# Patient Record
Sex: MALE | Race: WHITE | NOT HISPANIC OR LATINO | Employment: OTHER | URBAN - NONMETROPOLITAN AREA
[De-identification: names, ages, dates, MRNs, and addresses within clinical notes are randomized per-mention and may not be internally consistent; named-entity substitution may affect disease eponyms.]

---

## 2022-12-18 ENCOUNTER — OFFICE VISIT (OUTPATIENT)
Dept: URGENT CARE | Facility: CLINIC | Age: 72
End: 2022-12-18

## 2022-12-18 VITALS
OXYGEN SATURATION: 99 % | TEMPERATURE: 98.3 F | HEART RATE: 72 BPM | RESPIRATION RATE: 18 BRPM | RESPIRATION RATE: 18 BRPM | HEART RATE: 72 BPM | OXYGEN SATURATION: 99 % | WEIGHT: 182 LBS | WEIGHT: 182 LBS | TEMPERATURE: 98.3 F

## 2022-12-18 DIAGNOSIS — J01.00 ACUTE NON-RECURRENT MAXILLARY SINUSITIS: Primary | ICD-10-CM

## 2022-12-18 RX ORDER — AMOXICILLIN 875 MG/1
875 TABLET, COATED ORAL 2 TIMES DAILY
Qty: 14 TABLET | Refills: 0 | Status: SHIPPED | OUTPATIENT
Start: 2022-12-18 | End: 2022-12-25

## 2022-12-18 RX ORDER — BISOPROLOL FUMARATE AND HYDROCHLOROTHIAZIDE 5; 6.25 MG/1; MG/1
TABLET ORAL
COMMUNITY
Start: 2022-10-16

## 2022-12-18 RX ORDER — LISINOPRIL 5 MG/1
TABLET ORAL
COMMUNITY
Start: 2022-10-16

## 2022-12-18 RX ORDER — SIMVASTATIN 20 MG
TABLET ORAL
COMMUNITY
Start: 2022-10-17

## 2022-12-18 RX ORDER — AMOXICILLIN 875 MG/1
875 TABLET, COATED ORAL 2 TIMES DAILY
Qty: 14 TABLET | Refills: 0 | Status: SHIPPED | OUTPATIENT
Start: 2022-12-18 | End: 2022-12-18

## 2022-12-18 RX ORDER — BENZONATATE 200 MG/1
CAPSULE ORAL
COMMUNITY
Start: 2022-12-04

## 2022-12-18 NOTE — PROGRESS NOTES
[address] [facility]        NAME: Rodney Muro is a 67 y o  male  : 1950    MRN: [id number]  DATE: 2022  TIME: 3:32 PM    Assessment and Plan   Acute non-recurrent maxillary sinusitis [J01 00]  1  Acute non-recurrent maxillary sinusitis  amoxicillin (AMOXIL) 875 mg tablet    DISCONTINUED: amoxicillin (AMOXIL) 875 mg tablet            Patient Instructions     Take the antibiotics with food in the morning  You can use a saline nasal spray for congestion  Use a warm mist humidifier or vaporizer  Hot tea with honey  Warm saline gargle or throat lozenge may help with a sore throat  Drink plenty of fluids  Follow up with PCP in 3-5 days  Proceed to  ER if symptoms worsen  Chief Complaint     Chief Complaint   Patient presents with   • Cough     X1 1/2 months: Had Covid approx x1 1/2 months ago and was recently seen at PCP this past Wednesday with this continued cough  The cough is not getting better and he continues with this persistent Dry & occas moist prod cough (clear)  History of Present Illness       Cough  This is a new problem  The current episode started more than 1 month ago  The problem has been gradually worsening  The problem occurs hourly  The cough is non-productive  Associated symptoms include headaches, nasal congestion and postnasal drip  Pertinent negatives include no chest pain, chills, ear pain, fever, rhinorrhea or shortness of breath  Treatments tried: OTC sinus medication  Review of Systems   Review of Systems   Constitutional: Negative for activity change, appetite change, chills and fever  HENT: Positive for congestion, postnasal drip and sinus pressure  Negative for ear pain and rhinorrhea  Respiratory: Positive for cough  Negative for shortness of breath  Cardiovascular: Negative for chest pain and palpitations  Neurological: Positive for headaches  All other systems reviewed and are negative          Current Medications Current Outpatient Medications:   •  amoxicillin (AMOXIL) 875 mg tablet, Take 1 tablet (875 mg total) by mouth 2 (two) times a day for 7 days, Disp: 14 tablet, Rfl: 0  •  benzonatate (TESSALON) 200 MG capsule, TAKE 1 CAPSULE BY MOUTH THREE TIMES A DAY AS NEEDED FOR COUGH, Disp: , Rfl:   •  bisoprolol-hydrochlorothiazide (ZIAC) 5-6 25 MG per tablet, , Disp: , Rfl:   •  lisinopril (ZESTRIL) 5 mg tablet, , Disp: , Rfl:   •  simvastatin (ZOCOR) 20 mg tablet, , Disp: , Rfl:     Current Allergies     Allergies as of 12/18/2022   • (No Known Allergies)            The following portions of the patient's history were reviewed and updated as appropriate: allergies, current medications, past family history, past medical history, past social history, past surgical history and problem list      No past medical history on file  No past surgical history on file  No family history on file  Medications have been verified  Objective   Pulse 72   Temp 98 3 °F (36 8 °C) (Oral)   Resp 18   Wt 82 6 kg (182 lb)   SpO2 99%        Physical Exam     Physical Exam  Vitals and nursing note reviewed  Constitutional:       General: He is not in acute distress  Appearance: Normal appearance  He is normal weight  He is not ill-appearing or toxic-appearing  HENT:      Right Ear: A middle ear effusion is present  Left Ear: A middle ear effusion is present  Nose:      Right Sinus: Frontal sinus tenderness present  Left Sinus: Frontal sinus tenderness present  Mouth/Throat:      Pharynx: Oropharynx is clear  Cardiovascular:      Rate and Rhythm: Normal rate and regular rhythm  Pulses: Normal pulses  Heart sounds: Normal heart sounds  Pulmonary:      Effort: Pulmonary effort is normal       Breath sounds: Normal breath sounds  Neurological:      Mental Status: He is alert

## 2022-12-18 NOTE — PATIENT INSTRUCTIONS
Take the antibiotics with food in the morning  You can use a saline nasal spray for congestion  Use a warm mist humidifier or vaporizer  Hot tea with honey  Warm saline gargle or throat lozenge may help with a sore throat  Drink plenty of fluids

## 2022-12-19 NOTE — PROGRESS NOTES
Patient called requesting a different medication, he is unable to swallow the pill secondary to size  Made patient aware the provider that seen him is not working today  Recommended to call pharmacy for assistance, or alternative to size of pills, or instructions for administration (I e  crushing, etc )  Verbalized an understanding, and will contact the pharmacy